# Patient Record
Sex: MALE | URBAN - METROPOLITAN AREA
[De-identification: names, ages, dates, MRNs, and addresses within clinical notes are randomized per-mention and may not be internally consistent; named-entity substitution may affect disease eponyms.]

---

## 2017-08-02 ENCOUNTER — APPOINTMENT (OUTPATIENT)
Age: 47
Setting detail: DERMATOLOGY
End: 2017-08-02

## 2017-08-02 DIAGNOSIS — Z41.9 ENCOUNTER FOR PROCEDURE FOR PURPOSES OTHER THAN REMEDYING HEALTH STATE, UNSPECIFIED: ICD-10-CM

## 2017-08-02 PROCEDURE — OTHER OTHER (COSMETIC): OTHER

## 2017-08-02 PROCEDURE — OTHER PULSED-DYE LASER: OTHER

## 2017-08-02 ASSESSMENT — LOCATION SIMPLE DESCRIPTION DERM
LOCATION SIMPLE: NOSE
LOCATION SIMPLE: LEFT CHEEK
LOCATION SIMPLE: RIGHT CHEEK
LOCATION SIMPLE: RIGHT FOREHEAD
LOCATION SIMPLE: LEFT FOREHEAD

## 2017-08-02 ASSESSMENT — LOCATION DETAILED DESCRIPTION DERM
LOCATION DETAILED: RIGHT INFERIOR CENTRAL MALAR CHEEK
LOCATION DETAILED: NASAL DORSUM
LOCATION DETAILED: LEFT MEDIAL FOREHEAD
LOCATION DETAILED: RIGHT FOREHEAD
LOCATION DETAILED: LEFT CENTRAL MALAR CHEEK
LOCATION DETAILED: RIGHT CENTRAL MALAR CHEEK

## 2017-08-02 ASSESSMENT — LOCATION ZONE DERM
LOCATION ZONE: NOSE
LOCATION ZONE: FACE

## 2017-08-02 NOTE — PROCEDURE: OTHER (COSMETIC)
Detail Level: Zone
Price (Use Numbers Only, No Special Characters Or $): 300.00
Other (Free Text): Treated pts cheeks, nose and forehead with vbeam for rosacea. Pt has had same treatment 8 years ago and had good results. Rec 2-3 treatments 1 month apart then 1-2 annually to keep under control. Pt tolerated tx well. Applied spf 50 post and gave ice pack. MACHELLE

## 2017-08-02 NOTE — PROCEDURE: PULSED-DYE LASER
Cryogen Time (Ms): 30
Treated Area: small area
Detail Level: Zone
Delay Time (Ms): 20
Pulse Duration: 20 ms
Location (Required For Details To Render In Note But Body Touch Will Also Count For First Location): mid face
Spot Size: 7 mm
Fluence In J/Cm2 (Optional): 10
Pulse Duration: 6 ms
Post-Procedure Care: Vaseline and ice applied. Post care reviewed with patient.
Consent: Written consent obtained, risks reviewed including but not limited to crusting, scabbing, blistering, scarring, darker or lighter pigmentary change, incidental hair removal, bruising, and/or incomplete removal.
Fluence In J/Cm2 (Optional): 9
Spot Size: 10 mm
Pulse Duration: 10 ms
Price (Use Numbers Only, No Special Characters Or $): 300.00
Post-Care Instructions: I reviewed with the patient in detail post-care instructions. Patient should stay away from the sun and wear sun protection until treated areas are fully healed.
Cryogen Time (Ms): 40
Cryogen Time (Ms): 30
Spot Size: 5 mm
Laser Type: Vbeam 595nm

## 2017-09-13 ENCOUNTER — APPOINTMENT (OUTPATIENT)
Age: 47
Setting detail: DERMATOLOGY
End: 2017-09-19

## 2017-09-13 DIAGNOSIS — Z41.9 ENCOUNTER FOR PROCEDURE FOR PURPOSES OTHER THAN REMEDYING HEALTH STATE, UNSPECIFIED: ICD-10-CM

## 2017-09-13 PROCEDURE — OTHER PULSED-DYE LASER: OTHER

## 2017-09-13 PROCEDURE — OTHER OTHER (COSMETIC): OTHER

## 2017-09-13 ASSESSMENT — LOCATION DETAILED DESCRIPTION DERM
LOCATION DETAILED: LEFT MEDIAL FOREHEAD
LOCATION DETAILED: RIGHT INFERIOR CENTRAL MALAR CHEEK
LOCATION DETAILED: LEFT INFERIOR CENTRAL MALAR CHEEK

## 2017-09-13 ASSESSMENT — LOCATION ZONE DERM: LOCATION ZONE: FACE

## 2017-09-13 ASSESSMENT — LOCATION SIMPLE DESCRIPTION DERM
LOCATION SIMPLE: LEFT CHEEK
LOCATION SIMPLE: LEFT FOREHEAD
LOCATION SIMPLE: RIGHT CHEEK

## 2017-09-13 NOTE — PROCEDURE: PULSED-DYE LASER
Pulse Duration: 20 ms
Treated Area: medium area
Post-Procedure Care: Vaseline and ice applied. Post care reviewed with patient.
Consent: Written consent obtained, risks reviewed including but not limited to crusting, scabbing, blistering, scarring, darker or lighter pigmentary change, incidental hair removal, bruising, and/or incomplete removal.
Price (Use Numbers Only, No Special Characters Or $): 300.00
Cryogen Time (Ms): 30
Fluence In J/Cm2 (Optional): 8
Spot Size: 10 mm
Cryogen Time (Ms): 40
Post-Care Instructions: I reviewed with the patient in detail post-care instructions. Patient should stay away from the sun and wear sun protection until treated areas are fully healed.
Delay Time (Ms): 30
Pulse Duration: 10 ms
Delay Time (Ms): 20
Location (Required For Details To Render In Note But Body Touch Will Also Count For First Location): mid face
Fluence In J/Cm2 (Optional): 10
Spot Size: 7 mm
Pulse Duration: 1.5 ms
Laser Type: Vbeam 595nm
Detail Level: Zone
Fluence In J/Cm2 (Optional): 9.5
Spot Size: 5 mm

## 2017-09-13 NOTE — PROCEDURE: OTHER (COSMETIC)
Detail Level: Zone
Other (Free Text): second PDL treatment on face. well tolerated. pts breakout are more controlled from 1st tx. calming cream and spf post. LM